# Patient Record
Sex: MALE | Race: WHITE
[De-identification: names, ages, dates, MRNs, and addresses within clinical notes are randomized per-mention and may not be internally consistent; named-entity substitution may affect disease eponyms.]

---

## 2017-10-10 NOTE — EDM.PDOC
ED HPI GENERAL MEDICAL PROBLEM





- General


Chief Complaint: Gastrointestinal Problem


Stated Complaint: VOMITING


Time Seen by Provider: 10/10/17 12:20


Source of Information: Reports: Patient, Family


History Limitations: Reports: No Limitations





- History of Present Illness


INITIAL COMMENTS - FREE TEXT/NARRATIVE: 





47-year-old male has had persistent nausea and vomiting with abdominal cramps 

radiating to his back for the past 24 hours. Started rather suddenly yesterday 

morning. No diarrhea, no fevers or chills. Patient is a chronic methadone 

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 No history of abdominal surgeries.


Onset: Sudden (Became ill fairly suddenly yesterday morning)


Duration: Hour(s): (24 hours)


Location: Reports: Abdomen (Epigastric discomfort)


Severity: Moderate


Associated Symptoms: Reports: Chest Pain (Lower chest pain), Malaise, Nausea/

Vomiting.  Denies: Fever/Chills, Headaches, Shortness of Breath


  ** Lower Abdomen


Pain Score (Numeric/FACES): 10





- Related Data


 Allergies











Allergy/AdvReac Type Severity Reaction Status Date / Time


 


antihistamine Allergy  Hives Uncoded 10/10/17 12:17











Home Meds: 


 Home Meds





ALPRAZolam 2 mg PO TID PRN 10/10/17 [History]


Diazepam [Valium] 10 mg PO TID 10/10/17 [History]


Methadone [Methadone Intensol] 80 mg PO DAILY 10/10/17 [History]


PARoxetine HCl [Paxil] 20 mg PO DAILY 10/10/17 [History]











Past Medical History


Genitourinary History: Reports: Renal Calculus


Psychiatric History: Reports: Developmental Delay





- Infectious Disease History


Infectious Disease History: Reports: Chicken Pox





Social & Family History





- Tobacco Use


Smoking Status *Q: Never Smoker





- Caffeine Use


Caffeine Use: Reports: None





- Recreational Drug Use


Recreational Drug Use: No





ED ROS GENERAL





- Review of Systems


Review Of Systems: See Below


Constitutional: Reports: Malaise.  Denies: Fever, Chills


HEENT: Reports: No Symptoms


Respiratory: Denies: Shortness of Breath, Cough


Cardiovascular: Reports: Chest Pain (Substernal lower chest pain continuous 

with epigastric area)


GI/Abdominal: Reports: Abdominal Pain, Nausea, Vomiting.  Denies: Diarrhea


: Reports: No Symptoms


Skin: Reports: No Symptoms


Neurological: Reports: No Symptoms


Psychiatric: Reports: No Symptoms





ED EXAM, GI/ABD





- Physical Exam


Exam: See Below


Exam Limited By: No Limitations


General Appearance: Alert, Mild Distress (Looks uncomfortable, actively 

vomiting at times)


Eyes: Bilateral: Normal Appearance (Well-hydrated, no jaundice)


Throat/Mouth: Normal Inspection


Head: Atraumatic


Respiratory/Chest: No Respiratory Distress, Lungs Clear


Cardiovascular: Regular Rate, Rhythm


GI/Abdominal Exam: Soft, Tender ( patient is tender to palpation across the 

upper abdomen), Abnormal Bowel Sounds (Bowel sounds are hypoactive)


Neurological: Alert, Oriented


Psychiatric: Anxious


Skin Exam: Warm, Dry





Course





- Vital Signs


Last Recorded V/S: 


 Last Vital Signs











Temp  98.4 F   10/11/17 07:21


 


Pulse  79   10/11/17 07:21


 


Resp  16   10/11/17 07:21


 


BP  91/54 L  10/11/17 07:21


 


Pulse Ox  96   10/11/17 07:21














- Orders/Labs/Meds


Labs: 


 Laboratory Tests











  10/10/17 10/10/17 Range/Units





  12:49 12:49 


 


WBC  19.4 H   (4.5-11.0)  K/uL


 


RBC  4.77   (4.30-5.90)  M/uL


 


Hgb  13.9   (12.0-15.0)  g/dL


 


Hct  39.3 L   (40.0-54.0)  %


 


MCV  82   (80-98)  fL


 


MCH  29   (27-31)  pg


 


MCHC  35   (32-36)  %


 


Plt Count  288   (150-400)  K/uL


 


Neut % (Auto)  90 H   (36-66)  %


 


Lymph % (Auto)  5 L   (24-44)  %


 


Mono % (Auto)  5   (2-6)  %


 


Eos % (Auto)  0 L   (2-4)  %


 


Baso % (Auto)  0   (0-1)  %


 


Sodium   134 L  (140-148)  mmol/L


 


Potassium   4.0  (3.6-5.2)  mmol/L


 


Chloride   97 L  (100-108)  mmol/L


 


Carbon Dioxide   28  (21-32)  mmol/L


 


Anion Gap   13.0  (5.0-14.0)  mmol/L


 


BUN   14  (7-18)  mg/dL


 


Creatinine   0.9  (0.8-1.3)  mg/dL


 


Est Cr Clr Drug Dosing   95.89  mL/min


 


Estimated GFR (MDRD)   > 60  (>60)  


 


Glucose   119 H  ()  mg/dL


 


Calcium   9.1  (8.5-10.1)  mg/dL


 


Total Bilirubin   0.5  (0.2-1.0)  mg/dL


 


AST   18  (15-37)  U/L


 


ALT   20  (12-78)  U/L


 


Alkaline Phosphatase   71  ()  U/L


 


Total Protein   7.6  (6.4-8.2)  g/dL


 


Albumin   3.8  (3.4-5.0)  g/dL


 


Globulin   3.8 H  (2.3-3.5)  g/dL


 


Albumin/Globulin Ratio   1.0 L  (1.2-2.2)  


 


Amylase   36  ()  U/L


 


Lipase   58 L  ()  U/L











Meds: 


Medications














Discontinued Medications














Generic Name Dose Route Start Last Admin





  Trade Name Freq  PRN Reason Stop Dose Admin


 


Benzocaine/Menthol  1 lozenge  10/10/17 17:30  





  Cepacol Sore Throat  MUCMEM   





  Q1H PRN   





  Sore Throat   


 


Bisacodyl  5 mg  10/10/17 17:30  





  Dulcolax  PO   





  DAILY PRN   





  Constipation   


 


Bupivacaine HCl  Confirm  10/10/17 15:45  10/10/17 18:07





  Marcaine 0.5%  Administered  10/10/17 15:46  20 ml





  Dose   Administration





  50 ml   





  .ROUTE   





  .STK-MED ONE   


 


Dexamethasone  Confirm  10/10/17 16:02  





  Dexamethasone  Administered  10/10/17 16:03  





  Dose   





  4 mg   





  .ROUTE   





  .STK-MED ONE   


 


Diphenhydramine HCl  50 mg  10/10/17 17:30  





  Benadryl  IVPUSH   





  Q4H PRN   





  Itching   


 


Docusate Sodium  100 mg  10/10/17 17:30  





  Colace  PO   





  BID PRN   





  Constipation   


 


Fentanyl  Confirm  10/10/17 16:03  





  Sublimaze  Administered  10/10/17 16:04  





  Dose   





  250 mcg   





  .ROUTE   





  .STK-MED ONE   


 


Fentanyl  50 mcg  10/10/17 17:30  10/11/17 05:37





  Sublimaze  IVPUSH   50 mcg





  Q1H PRN   Administration





  Pain (severe 7-10)   


 


Glycopyrrolate  Confirm  10/10/17 16:02  





  Robinul  Administered  10/10/17 16:03  





  Dose   





  1 mg   





  .ROUTE   





  .STK-MED ONE   


 


Hydromorphone HCl  1 mg  10/10/17 14:21  10/10/17 14:25





  Dilaudid  IM  10/10/17 14:22  1 mg





  ONETIME ONE   Administration


 


Hydroxyzine HCl  50 mg  10/10/17 17:30  10/10/17 21:42





  Vistaril  IM   50 mg





  Q4H PRN   Administration





  Nausea   


 


Sodium Chloride  1,000 mls @ 1,000 mls/hr  10/10/17 12:45  10/11/17 06:19





  Normal Saline  IV   75 mls/hr





  ASDIRECTED ALEJANDRA   Administration


 


Cefazolin Sodium/Dextrose 2 gm  50 mls @ 100 mls/hr  10/10/17 17:00  10/10/17 17

:31





  / Premix  IV  10/10/17 17:29  100 mls/hr





  ONETIME ONE   Administration


 


Metronidazole 500 mg/ Premix  100 mls @ 100 mls/hr  10/10/17 17:00  10/10/17 16:

25





  IV  10/10/17 17:59  100 mls/hr





  ONETIME ONE   Administration


 


Sodium Chloride  1,000 mls @ 75 mls/hr  10/10/17 16:15  10/10/17 18:51





  Normal Saline  IV   75 mls/hr





  ASDIRECTED ALEJANDRA   Administration


 


Ketorolac Tromethamine  30 mg  10/10/17 12:31  10/10/17 16:09





  Toradol  IVPUSH  10/10/17 12:32  Not Given





  ONETIME ONE   


 


Lidocaine/Epinephrine  Confirm  10/10/17 15:45  10/10/17 18:07





  Xylocaine 1% With Epinephrine 1:100,000  Administered  10/10/17 15:46  20 ml





  Dose   Administration





  50 ml   





  .ROUTE   





  .STK-MED ONE   


 


Methadone HCl  80 mg  10/11/17 08:15  10/11/17 07:53





  Methadone  PO  10/11/17 08:16  80 mg





  ONETIME ONE   Administration


 


Midazolam HCl  Confirm  10/10/17 16:02  





  Versed 1 Mg/Ml  Administered  10/10/17 16:03  





  Dose   





  2 mg   





  .ROUTE   





  .STK-MED ONE   


 


Morphine Sulfate  1 - 3 mg  10/10/17 16:29  10/10/17 16:39





  Morphine  IVPUSH   3 mg





  Q1H PRN   Administration





  Abdominal Pain   


 


Neostigmine Methylsulfate  Confirm  10/10/17 16:02  





  Neostigmine  Administered  10/10/17 16:03  





  Dose   





  5 mg   





  .ROUTE   





  .STK-MED ONE   


 


Ondansetron HCl  4 mg  10/10/17 12:31  10/10/17 16:33





  Zofran  IVPUSH  10/10/17 12:32  Not Given





  ONETIME ONE   


 


Ondansetron HCl  4 mg  10/10/17 13:36  10/10/17 13:40





  Zofran Odt  PO  10/10/17 13:37  4 mg





  ONETIME ONE   Administration


 


Ondansetron HCl  Confirm  10/10/17 16:02  





  Zofran  Administered  10/10/17 16:03  





  Dose   





  4 mg   





  .ROUTE   





  .STK-MED ONE   


 


Ondansetron HCl  4 mg  10/10/17 16:31  10/10/17 16:45





  Zofran  IVPUSH  10/10/17 16:32  4 mg





  ONETIME ONE   Administration


 


Ondansetron HCl  4 mg  10/10/17 22:53  





  Zofran Odt  PO   





  Q4H PRN   





  Nausea/Vomiting   


 


Ondansetron HCl  4 mg  10/10/17 22:53  10/10/17 23:20





  Zofran  IVPUSH   4 mg





  Q4H PRN   Administration





  Nausea/Vomiting   


 


Oxycodone/Acetaminophen  2 tab  10/10/17 17:30  10/11/17 06:22





  Percocet 325-10 Mg  PO   2 tab





  Q4H PRN   Administration





  Pain (moderate 4-6)   


 


Prochlorperazine Edisylate  5 - 10 mg  10/11/17 01:26  10/11/17 01:50





  Compazine  IVPUSH   10 mg





  Q4H PRN   Administration





  Nausea/Vomiting   


 


Propofol  Confirm  10/10/17 16:02  





  Diprivan  20 Ml  Administered  10/10/17 16:03  





  Dose   





  200 mg   





  .ROUTE   





  .STK-MED ONE   


 


Rocuronium Bromide  Confirm  10/10/17 16:02  





  Zemuron  Administered  10/10/17 16:03  





  Dose   





  50 mg   





  .ROUTE   





  .STK-MED ONE   


 


Scopolamine  1.5 mg  10/10/17 22:56  10/10/17 23:20





  Transderm-Scop  TRDERM   1.5 mg





  Q72H PRN   Administration





  Nausea/Vomiting   


 


Sodium Chloride  1,000 ml  10/10/17 18:07  10/10/17 18:07





  Normal Saline  IRR  10/10/17 18:08  1,000 ml





  .STK-MED ONE   Administration


 


Succinylcholine Chloride  Confirm  10/10/17 16:02  





  Quelicin  Administered  10/10/17 16:03  





  Dose   





  200 mg   





  .ROUTE   





  .STK-MED ONE   


 


Zolpidem Tartrate  5 mg  10/10/17 17:30  





  Ambien  PO   





  BEDTIME PRN   





  Insomnia   














- Re-Assessments/Exams


Free Text/Narrative Re-Assessment/Exam: 





10/10/17 14:03


An IV was attempted several times but the patient's vascular access was very 

difficult. He was given 4 mg of sublingual Zofran. White count was elevated at 

19,000, however LFTs amylase and lipase were normal. Patient slowly improved. A 

CT the abdomen and pelvis without IV contrast was obtained.


10/10/17 14:22


CT scan showed a very thickened gallbladder wall but no other significant 

abnormalities. This be followed by a gallbladder ultrasound. 1 mg of Dilaudid 

IM was given prior to the ultrasound.


10/10/17 15:18


Gallbladder ultrasound confirmed a very thickened gallbladder wall, and 

impacted gallstone in the neck of the gallbladder. Dr. Pang of surgical 

services was consulted to see the patient.





Departure





- Departure


Time of Disposition: 17:37


Disposition: Admitted As Inpatient 67


Condition: Fair


Clinical Impression: 


 Abdominal pain, Cholecystitis








- Discharge Information

## 2017-10-10 NOTE — CT
Abdomen pelvis CT.

 

History: Abdominal pain.

 

Technique: Unenhanced axial images were obtained from the lung bases extending through the abdomen an
d pelvis. Coronal images were reconstructed.

 

Total DLP: 575.

 

Comparison: None

 

Findings: Limited evaluation of the lower lung fields demonstrates no acute findings. The liver is ho
mogeneous. There is thickening of the gallbladder wall. There is evidence of possible shaun-cholecysti
c fluid. No definite gallstones are seen. There is no biliary ductal dilatation. The pancreas and adj
acent tissues are unremarkable. The spleen is normal in size. There is a hiatal hernia. The adrenal g
lands are symmetric. The kidneys demonstrate no evidence of hydronephrosis. There are no calculi. The
re is no large or small bowel distention. There is no wall thickening or inflammation.

 

Impression:

1. Findings concerning for acute cholecystitis. There is gallbladder wall thickening. Clinical correl
ation recommended. Ultrasound may provide additional information.

## 2017-10-11 NOTE — OR
DATE OF PROCEDURE:  10/10/2017

 

PROCEDURE:  Laparoscopic cholecystectomy.

 

PREOPERATIVE DIAGNOSIS:  Cholelithiasis, cholecystitis.

 

POSTOPERATIVE DIAGNOSIS:  Cholelithiasis, cholecystitis.

 

ANESTHESIA:  General/local.

 

INDICATIONS:  A pleasant gentleman with right upper quadrant abdominal pain who was

evaluated and diagnosed with cholelithiasis, cholecystitis requiring laparoscopic

gallbladder surgery.

 

RISKS:  Risks, benefits, alternatives, limitations including, but not limited to infection,

bleeding, perforation of abdominal structures, common bile duct injury, and cystic duct

leaks were all explained to the patient and wished to proceed.

 

PROCEDURE IN DETAIL:  The patient was placed in the supine position.  A supraumbilical

curvilinear incision was made.  A Veress needle was used to enter the abdomen without

abnormality and a drop test was performed without abnormality.  The abdomen was subsequently

insufflated.  This was followed by an Optiview trocar.

 

An additional 10 and two 5-mm ports were entered under direct visualization.  Gallbladder

was noted to be thickened, edematous, and wrapped to the omentum.

 

The gallbladder was retracted cephalad.  The infundibulum was retracted inferolaterally.

Using blunt dissection, only a "clear view" of the gallbladder was obtained and the

gallbladder with a single pulsatile structure in the gallbladder and a single nonpulsatile

structure entering the gallbladder.

 

The two structures were subsequently clipped x3 and transected.

 

The remaining 1/3 of the gallbladder was removed off the gallbladder bed without difficulty.

This was delivered through the superior port.  This had to be enlarged due to large size of

the gallbladder.

 

Electrocautery was used to control the bleeding on the gallbladder bed.  The clips were

inspected and found to be cross placed in the bed completely.  The abdomen was thoroughly

irrigated and the fluid subsequently removed.  The air was removed.  After inspecting the

entry site, no evidence of abnormality or injury was noted.

 

The wounds were thoroughly irrigated and closed with 3-0 Vicryl and 4-0 Vicryl interrupted

running fashion.  Dermabond was applied.  The patient tolerated the procedure well.

 

 

 

 

Hai Pang MD

DD:  10/10/2017 18:48:30

DT:  10/10/2017 23:42:22

Job #:  [id number]

## 2017-10-11 NOTE — PN
DATE OF SERVICE:  10/11/2017

 

SUBJECTIVE:  The patient is doing very well.  Pain is well controlled.  No nausea, vomiting,

shortness of breath, or chest pain.

 

OBJECTIVE:  VITAL SIGNS:  Stable.  Temperature 98.4, blood pressure 91/54, respirations 16,

and pulse 79.

CARDIOVASCULAR:  Regular rhythm and rate.

RESPIRATORY:  Lungs are clear to consultation bilaterally.

ABDOMEN:  Incision is healing well.

 

LABORATORY RESULTS:  Show a white blood cell count decreased at 13,000.  Total bilirubin is

normal.

 

ASSESSMENT:  Status post laparoscopic cholecystectomy.

 

PLAN:  The patient will be discharged today.  We discussed diet, activity, followup, signs

and symptoms, complications.  Please see the discharge summary for further details.

 

 

 

 

Hai Pang MD

DD:  10/11/2017 08:27:03

DT:  10/11/2017 09:48:13

Job #:  [id number]
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
## 2022-07-23 ENCOUNTER — HOSPITAL ENCOUNTER (EMERGENCY)
Dept: HOSPITAL 11 - JP.ED | Age: 52
Discharge: HOME | End: 2022-07-23
Payer: MEDICAID

## 2022-07-23 DIAGNOSIS — Z90.49: ICD-10-CM

## 2022-07-23 DIAGNOSIS — R10.11: Primary | ICD-10-CM

## 2022-07-23 DIAGNOSIS — Z79.899: ICD-10-CM

## 2022-07-23 DIAGNOSIS — Z88.8: ICD-10-CM
